# Patient Record
Sex: MALE | Employment: FULL TIME | URBAN - METROPOLITAN AREA
[De-identification: names, ages, dates, MRNs, and addresses within clinical notes are randomized per-mention and may not be internally consistent; named-entity substitution may affect disease eponyms.]

---

## 2021-07-01 ENCOUNTER — COMMUNICATION - HEALTHEAST (OUTPATIENT)
Dept: LAB | Facility: CLINIC | Age: 41
End: 2021-07-01

## 2021-07-04 NOTE — TELEPHONE ENCOUNTER
Telephone Encounter by Micki Connors LPN at 7/2/2021 10:10 AM     Author: Micki Connors LPN Service: -- Author Type: Licensed Nurse    Filed: 7/2/2021 10:10 AM Encounter Date: 7/1/2021 Status: Signed    : Micki Connors LPN (Licensed Nurse)       Patient is scheduled for 7/8

## 2021-07-04 NOTE — TELEPHONE ENCOUNTER
Telephone Encounter by Jc Fairchild MD at 7/2/2021  9:59 AM     Author: Jc Fairchild MD Service: -- Author Type: Physician    Filed: 7/2/2021  9:59 AM Encounter Date: 7/1/2021 Status: Signed    : Jc Fairchild MD (Physician)       Please call patient to remind

## 2021-07-04 NOTE — TELEPHONE ENCOUNTER
"Telephone Encounter by Margarita Dunne at 7/1/2021  6:17 PM     Author: Margarita Dunne Service: -- Author Type:     Filed: 7/1/2021  6:17 PM Encounter Date: 7/1/2021 Status: Signed    : Margarita Dunne ()       Lab orders were placed in patient chart.  Patient did not come to the lab the day orders were placed.    Lab orders must be cancelled and reordered as \"Future\" with an expected date.  Thank you       "

## 2021-07-08 ENCOUNTER — AMBULATORY - HEALTHEAST (OUTPATIENT)
Dept: LAB | Facility: CLINIC | Age: 41
End: 2021-07-08

## 2021-07-08 DIAGNOSIS — E11.9 TYPE 2 DIABETES MELLITUS WITHOUT COMPLICATION, WITHOUT LONG-TERM CURRENT USE OF INSULIN (H): ICD-10-CM

## 2021-07-08 LAB
ALBUMIN SERPL-MCNC: 4.2 G/DL (ref 3.5–5)
ALP SERPL-CCNC: 51 U/L (ref 45–120)
ALT SERPL W P-5'-P-CCNC: 13 U/L (ref 0–45)
ANION GAP SERPL CALCULATED.3IONS-SCNC: 14 MMOL/L (ref 5–18)
AST SERPL W P-5'-P-CCNC: 11 U/L (ref 0–40)
BILIRUB SERPL-MCNC: 0.4 MG/DL (ref 0–1)
BUN SERPL-MCNC: 14 MG/DL (ref 8–22)
CALCIUM SERPL-MCNC: 9.6 MG/DL (ref 8.5–10.5)
CHLORIDE BLD-SCNC: 102 MMOL/L (ref 98–107)
CHOLEST SERPL-MCNC: 189 MG/DL
CO2 SERPL-SCNC: 23 MMOL/L (ref 22–31)
CREAT SERPL-MCNC: 0.7 MG/DL (ref 0.7–1.3)
FASTING STATUS PATIENT QL REPORTED: NO
GFR SERPL CREATININE-BSD FRML MDRD: >60 ML/MIN/1.73M2
GLUCOSE BLD-MCNC: 184 MG/DL (ref 70–125)
HDLC SERPL-MCNC: 50 MG/DL
LDLC SERPL CALC-MCNC: 97 MG/DL
POTASSIUM BLD-SCNC: 4 MMOL/L (ref 3.5–5)
PROT SERPL-MCNC: 6.7 G/DL (ref 6–8)
SODIUM SERPL-SCNC: 139 MMOL/L (ref 136–145)
TRIGL SERPL-MCNC: 210 MG/DL

## 2021-07-09 LAB
CREAT UR-MCNC: 105 MG/DL
MICROALBUMIN UR-MCNC: 0.94 MG/DL (ref 0–1.99)
MICROALBUMIN/CREAT UR: 9 MG/G

## 2021-07-15 ENCOUNTER — OFFICE VISIT (OUTPATIENT)
Dept: FAMILY MEDICINE | Facility: CLINIC | Age: 41
End: 2021-07-15
Payer: COMMERCIAL

## 2021-07-15 VITALS
SYSTOLIC BLOOD PRESSURE: 112 MMHG | HEART RATE: 112 BPM | BODY MASS INDEX: 22.09 KG/M2 | HEIGHT: 64 IN | DIASTOLIC BLOOD PRESSURE: 64 MMHG | RESPIRATION RATE: 18 BRPM | OXYGEN SATURATION: 97 % | TEMPERATURE: 98.4 F | WEIGHT: 129.4 LBS

## 2021-07-15 DIAGNOSIS — E11.9 TYPE 2 DIABETES MELLITUS WITHOUT COMPLICATION, WITHOUT LONG-TERM CURRENT USE OF INSULIN (H): Primary | ICD-10-CM

## 2021-07-15 PROCEDURE — 99215 OFFICE O/P EST HI 40 MIN: CPT | Performed by: FAMILY MEDICINE

## 2021-07-15 RX ORDER — GLIPIZIDE 5 MG/1
5 TABLET ORAL
Qty: 60 TABLET | Refills: 0 | Status: SHIPPED | OUTPATIENT
Start: 2021-07-15

## 2021-07-15 ASSESSMENT — MIFFLIN-ST. JEOR: SCORE: 1392.95

## 2021-07-15 NOTE — PROGRESS NOTES
"    Assessment & Plan     Type 2 diabetes mellitus without complication, without long-term current use of insulin (H)    - **A1C FUTURE 3mo; Future  > May start with taking 2.5 mg bid, monitor Blood glucose and increase to 5 mg bid to keep BS between  , and Drop metformin to 500 mg bid   Take prilosec for stomach symptoms, and follow up in a week.   Follow up in a month     Start:   - glipiZIDE (GLUCOTROL) 5 MG tablet; Take 1 tablet (5 mg) by mouth 2 times daily (before meals)      I spent a total of 48 minutes on the day of the visit.   Time spent doing chart review, history and exam, documentation and further activities per the note        No follow-ups on file.    Shayy Fairchild MD  [facility]    Carol Farmer is a 41 year old who presents for health care establishment and diabetes management.  He was previously on Metformin but not compliant for about the past 4 years, and ended in urgent care 4 months ago with ketoacidosis.  Is now back on taking Levothroid 1000 mg twice daily, with no additional issues or concerns. He still has readings in mid to higher 100's range.  No other concerns.       Review of Systems         Objective    /64   Pulse 112   Temp 98.4  F (36.9  C)   Resp 18   Ht 1.626 m (5' 4\")   Wt 58.7 kg (129 lb 6.4 oz)   SpO2 97%   BMI 22.21 kg/m    Body mass index is 22.21 kg/m .     Physical Exam   GENERAL: healthy, alert and no distress  NECK: no adenopathy, no asymmetry, masses, or scars and thyroid normal to palpation  RESP: lungs clear to auscultation - no rales, rhonchi or wheezes  CV: regular rate and rhythm, normal S1 S2, no S3 or S4, no murmur, click or rub, no peripheral edema and peripheral pulses strong  ABDOMEN: soft, nontender, no hepatosplenomegaly, no masses and bowel sounds normal  MS: no gross musculoskeletal defects noted, no edema                "

## 2021-07-26 DIAGNOSIS — E11.9 TYPE 2 DIABETES MELLITUS WITHOUT COMPLICATION, WITHOUT LONG-TERM CURRENT USE OF INSULIN (H): ICD-10-CM

## 2021-07-26 NOTE — TELEPHONE ENCOUNTER
Pending Prescriptions:                       Disp   Refills    metFORMIN (GLUCOPHAGE) 1000 MG tablet     60 tab*0            Sig: Take 1 tablet (1,000 mg) by mouth 2 times daily           (with meals)  Last filled 7/1/2021

## 2021-07-26 NOTE — LETTER
Darian Cisneros  [id number] [id number]      08/24/21    Dear Darian,      In reviewing your records, we have determined an Establish Care appointment is needed due to the recent long-term of your previous provider.  Please schedule before your next refill is needed.  Please call [phone] to schedule an:      ESTABLISH CARE APPOINTMENT    The following providers at our clinic would be happy to partner with you for your  healthcare needs:      Jc Hwang MD Family Medicine    Christine Evans MD Internal Medicine    No Corona MD Family Medicine      We believe that a strong preventative care program, including regular physicals and follow-up care is an important part of a healthy lifestyle and we are committed to helping you maintain your health.    Thank you for choosing us as your health care provider.    Sincerely,    Micki Connors LPN - CMT/CA  [facility]  [address] [address]  [phone]

## 2021-07-29 NOTE — TELEPHONE ENCOUNTER
Patient is due to Establish care.  See note on 7/1/21. Please call to advise and assist in scheduling an in person visit.

## 2021-07-29 NOTE — TELEPHONE ENCOUNTER
"Routing refill request to provider for review/approval because:  Labs not current:  A1C    Last Written Prescription Date:  7/1/21  Last Fill Quantity: 60,  # refills: 0   Last office visit provider:  7/1/21     Requested Prescriptions   Pending Prescriptions Disp Refills     metFORMIN (GLUCOPHAGE) 1000 MG tablet 60 tablet 0     Sig: Take 1 tablet (1,000 mg) by mouth 2 times daily (with meals)       Biguanide Agents Failed - 7/26/2021  9:04 AM        Failed - Patient has documented A1c within the specified period of time.     If HgbA1C is 8 or greater, it needs to be on file within the past 3 months.  If less than 8, must be on file within the past 6 months.     No lab results found.          Passed - Patient is age 10 or older        Passed - Patient's CR is NOT>1.4 OR Patient's EGFR is NOT<45 within past 12 mos.     Recent Labs   Lab Test 07/08/21  1655   GFRESTIMATED >60   GFRESTBLACK >60       Recent Labs   Lab Test 07/08/21  1655   CR 0.70             Passed - Patient does NOT have a diagnosis of CHF.        Passed - Medication is active on med list        Passed - Recent (6 mo) or future (30 days) visit within the authorizing provider's specialty     Patient had office visit in the last 6 months or has a visit in the next 30 days with authorizing provider or within the authorizing provider's specialty.  See \"Patient Info\" tab in inbasket, or \"Choose Columns\" in Meds & Orders section of the refill encounter.                 Tutu Garrett RN 07/29/21 1:59 PM  "

## 2022-03-18 DIAGNOSIS — E11.9 TYPE 2 DIABETES MELLITUS WITHOUT COMPLICATION, WITHOUT LONG-TERM CURRENT USE OF INSULIN (H): ICD-10-CM

## 2022-03-18 NOTE — TELEPHONE ENCOUNTER
"Routing refill request to provider for review/approval because:  Labs not current:  A1C  Patient needs to be seen because it has been more than 6 months since last office visit.    Last Written Prescription Date:  7/30/21  Last Fill Quantity: 180,  # refills: 0   Last office visit provider:  7/15/21     Requested Prescriptions   Pending Prescriptions Disp Refills     metFORMIN (GLUCOPHAGE) 1000 MG tablet 180 tablet 0     Sig: Take 1 tablet (1,000 mg) by mouth 2 times daily (with meals)       Biguanide Agents Failed - 3/18/2022  3:16 PM        Failed - Patient has documented A1c within the specified period of time.     If HgbA1C is 8 or greater, it needs to be on file within the past 3 months.  If less than 8, must be on file within the past 6 months.     No lab results found.          Failed - Recent (6 mo) or future (30 days) visit within the authorizing provider's specialty     Patient had office visit in the last 6 months or has a visit in the next 30 days with authorizing provider or within the authorizing provider's specialty.  See \"Patient Info\" tab in inbasket, or \"Choose Columns\" in Meds & Orders section of the refill encounter.            Passed - Patient is age 10 or older        Passed - Patient's CR is NOT>1.4 OR Patient's EGFR is NOT<45 within past 12 mos.     Recent Labs   Lab Test 07/08/21  1655   GFRESTIMATED >60   GFRESTBLACK >60       Recent Labs   Lab Test 07/08/21  1655   CR 0.70             Passed - Patient does NOT have a diagnosis of CHF.        Passed - Medication is active on med list             Tutu Garrett RN 03/18/22 3:16 PM  "

## 2022-03-18 NOTE — TELEPHONE ENCOUNTER
Patient is requesting refill of Metformin.      Metformin (GLUCOPHAGE) 1000 mg tablet    [facility] on [address] is the preferred pharmacy.    Last office visit with PCP = 7/15/2021  Next office visit with PCP = none     Patient declined to schedule appt with PCP at this time.  He hopes PCP will understand.  He has had a great deal of medical bills for himself and children, he is in collections and does not want to incur more medical bills while he is working on paying off the current debt.

## 2022-03-21 NOTE — TELEPHONE ENCOUNTER
Patient is due for a follow up, please assist in scheduling an in person appointment.   See bridge fill

## 2022-03-30 NOTE — TELEPHONE ENCOUNTER
Writer called and spoke with the patient, he stated that his medical bills have been mounting and he cannot afford to make an appointment at this time, he will try to make one in a month or two.

## 2022-04-18 DIAGNOSIS — E11.9 TYPE 2 DIABETES MELLITUS WITHOUT COMPLICATION, WITHOUT LONG-TERM CURRENT USE OF INSULIN (H): ICD-10-CM

## 2022-04-18 NOTE — TELEPHONE ENCOUNTER
Pending Prescriptions:                       Disp   Refills    metFORMIN (GLUCOPHAGE) 1000 MG tablet     60 tab*0            Sig: Take 1 tablet (1,000 mg) by mouth 2 times daily           (with meals)

## 2022-07-28 DIAGNOSIS — E11.9 TYPE 2 DIABETES MELLITUS WITHOUT COMPLICATION, WITHOUT LONG-TERM CURRENT USE OF INSULIN (H): Primary | ICD-10-CM

## 2022-07-28 NOTE — TELEPHONE ENCOUNTER
"Routing refill request to provider for review/approval because:  Labs not current:  a1c cr    Last Written Prescription Date:  4/19/22  Last Fill Quantity: 180,  # refills: 0   Last office visit provider:  7/15/21     Requested Prescriptions   Pending Prescriptions Disp Refills     metFORMIN (GLUCOPHAGE) 1000 MG tablet 180 tablet 0     Sig: Take 1 tablet (1,000 mg) by mouth 2 times daily (with meals)       Biguanide Agents Failed - 7/28/2022 11:19 AM        Failed - Patient has documented A1c within the specified period of time.     If HgbA1C is 8 or greater, it needs to be on file within the past 3 months.  If less than 8, must be on file within the past 6 months.     No lab results found.          Failed - Patient's CR is NOT>1.4 OR Patient's EGFR is NOT<45 within past 12 mos.     Recent Labs   Lab Test 07/08/21  1655   GFRESTIMATED >60   GFRESTBLACK >60       Recent Labs   Lab Test 07/08/21  1655   CR 0.70             Failed - Recent (6 mo) or future (30 days) visit within the authorizing provider's specialty     Patient had office visit in the last 6 months or has a visit in the next 30 days with authorizing provider or within the authorizing provider's specialty.  See \"Patient Info\" tab in inbasket, or \"Choose Columns\" in Meds & Orders section of the refill encounter.            Passed - Patient is age 10 or older        Passed - Patient does NOT have a diagnosis of CHF.        Passed - Medication is active on med list             Sophie Simeon RN 07/28/22 2:16 PM  "

## 2022-08-05 NOTE — TELEPHONE ENCOUNTER
Writer called and spoke with the patient, he stated that he was not in a financial position to make an appointment at this time, writer asked if he would like a patient advocate to call him and he declined stating that he would make an appointment soon.